# Patient Record
Sex: FEMALE | Race: WHITE | Employment: FULL TIME | URBAN - METROPOLITAN AREA
[De-identification: names, ages, dates, MRNs, and addresses within clinical notes are randomized per-mention and may not be internally consistent; named-entity substitution may affect disease eponyms.]

---

## 2019-02-11 ENCOUNTER — APPOINTMENT (OUTPATIENT)
Dept: CT IMAGING | Age: 59
End: 2019-02-11
Payer: COMMERCIAL

## 2019-02-11 ENCOUNTER — HOSPITAL ENCOUNTER (EMERGENCY)
Age: 59
Discharge: HOME | End: 2019-02-11
Payer: COMMERCIAL

## 2019-02-11 VITALS
HEART RATE: 74 BPM | TEMPERATURE: 97.9 F | OXYGEN SATURATION: 94 % | RESPIRATION RATE: 16 BRPM | DIASTOLIC BLOOD PRESSURE: 62 MMHG | SYSTOLIC BLOOD PRESSURE: 106 MMHG

## 2019-02-11 DIAGNOSIS — R91.1 NODULE OF LOWER LOBE OF LEFT LUNG: ICD-10-CM

## 2019-02-11 DIAGNOSIS — D25.9 UTERINE LEIOMYOMA, UNSPECIFIED LOCATION: ICD-10-CM

## 2019-02-11 DIAGNOSIS — N20.0 RIGHT NEPHROLITHIASIS: ICD-10-CM

## 2019-02-11 DIAGNOSIS — R10.9 RIGHT FLANK PAIN: ICD-10-CM

## 2019-02-11 DIAGNOSIS — K57.30 DIVERTICULOSIS OF COLON WITHOUT DIVERTICULITIS: ICD-10-CM

## 2019-02-11 DIAGNOSIS — N28.89 MASS OF RIGHT KIDNEY: Primary | ICD-10-CM

## 2019-02-11 DIAGNOSIS — M54.50 ACUTE RIGHT-SIDED LOW BACK PAIN WITHOUT SCIATICA: ICD-10-CM

## 2019-02-11 LAB
A/G RATIO: 1.3 (ref 1.1–2.2)
ALBUMIN SERPL-MCNC: 4 G/DL (ref 3.4–5)
ALP BLD-CCNC: 50 U/L (ref 40–129)
ALT SERPL-CCNC: 12 U/L (ref 10–40)
ANION GAP SERPL CALCULATED.3IONS-SCNC: 16 MMOL/L (ref 3–16)
AST SERPL-CCNC: 12 U/L (ref 15–37)
BASOPHILS ABSOLUTE: 0.1 K/UL (ref 0–0.2)
BASOPHILS RELATIVE PERCENT: 1.2 %
BILIRUB SERPL-MCNC: 0.6 MG/DL (ref 0–1)
BILIRUBIN URINE: NEGATIVE
BLOOD, URINE: NEGATIVE
BUN BLDV-MCNC: 14 MG/DL (ref 7–20)
CALCIUM SERPL-MCNC: 9.4 MG/DL (ref 8.3–10.6)
CHLORIDE BLD-SCNC: 102 MMOL/L (ref 99–110)
CLARITY: CLEAR
CO2: 21 MMOL/L (ref 21–32)
COLOR: YELLOW
CREAT SERPL-MCNC: 0.7 MG/DL (ref 0.6–1.1)
EOSINOPHILS ABSOLUTE: 0.5 K/UL (ref 0–0.6)
EOSINOPHILS RELATIVE PERCENT: 7.3 %
GFR AFRICAN AMERICAN: >60
GFR NON-AFRICAN AMERICAN: >60
GLOBULIN: 3 G/DL
GLUCOSE BLD-MCNC: 161 MG/DL (ref 70–99)
GLUCOSE URINE: >=1000 MG/DL
HCT VFR BLD CALC: 43.1 % (ref 36–48)
HEMOGLOBIN: 14.6 G/DL (ref 12–16)
KETONES, URINE: NEGATIVE MG/DL
LACTIC ACID: 1.8 MMOL/L (ref 0.4–2)
LEUKOCYTE ESTERASE, URINE: NEGATIVE
LYMPHOCYTES ABSOLUTE: 0.8 K/UL (ref 1–5.1)
LYMPHOCYTES RELATIVE PERCENT: 13.1 %
MCH RBC QN AUTO: 31.6 PG (ref 26–34)
MCHC RBC AUTO-ENTMCNC: 33.9 G/DL (ref 31–36)
MCV RBC AUTO: 93.2 FL (ref 80–100)
MICROSCOPIC EXAMINATION: ABNORMAL
MONOCYTES ABSOLUTE: 0.5 K/UL (ref 0–1.3)
MONOCYTES RELATIVE PERCENT: 7.4 %
NEUTROPHILS ABSOLUTE: 4.5 K/UL (ref 1.7–7.7)
NEUTROPHILS RELATIVE PERCENT: 71 %
NITRITE, URINE: NEGATIVE
PDW BLD-RTO: 13.4 % (ref 12.4–15.4)
PH UA: 5.5
PLATELET # BLD: 166 K/UL (ref 135–450)
PMV BLD AUTO: 9.3 FL (ref 5–10.5)
POTASSIUM REFLEX MAGNESIUM: 3.7 MMOL/L (ref 3.5–5.1)
PROTEIN UA: NEGATIVE MG/DL
RBC # BLD: 4.63 M/UL (ref 4–5.2)
REASON FOR REJECTION: NORMAL
REJECTED TEST: NORMAL
SODIUM BLD-SCNC: 139 MMOL/L (ref 136–145)
SPECIFIC GRAVITY UA: >1.03
TOTAL PROTEIN: 7 G/DL (ref 6.4–8.2)
URINE REFLEX TO CULTURE: ABNORMAL
URINE TYPE: ABNORMAL
UROBILINOGEN, URINE: 0.2 E.U./DL
WBC # BLD: 6.3 K/UL (ref 4–11)

## 2019-02-11 PROCEDURE — 85025 COMPLETE CBC W/AUTO DIFF WBC: CPT

## 2019-02-11 PROCEDURE — 80053 COMPREHEN METABOLIC PANEL: CPT

## 2019-02-11 PROCEDURE — 96361 HYDRATE IV INFUSION ADD-ON: CPT

## 2019-02-11 PROCEDURE — 99284 EMERGENCY DEPT VISIT MOD MDM: CPT

## 2019-02-11 PROCEDURE — 96374 THER/PROPH/DIAG INJ IV PUSH: CPT

## 2019-02-11 PROCEDURE — 2580000003 HC RX 258: Performed by: PHYSICIAN ASSISTANT

## 2019-02-11 PROCEDURE — 96375 TX/PRO/DX INJ NEW DRUG ADDON: CPT

## 2019-02-11 PROCEDURE — 87040 BLOOD CULTURE FOR BACTERIA: CPT

## 2019-02-11 PROCEDURE — 83605 ASSAY OF LACTIC ACID: CPT

## 2019-02-11 PROCEDURE — 81003 URINALYSIS AUTO W/O SCOPE: CPT

## 2019-02-11 PROCEDURE — 74176 CT ABD & PELVIS W/O CONTRAST: CPT

## 2019-02-11 PROCEDURE — 6360000002 HC RX W HCPCS: Performed by: PHYSICIAN ASSISTANT

## 2019-02-11 RX ORDER — LIDOCAINE 50 MG/G
1 PATCH TOPICAL DAILY
Qty: 30 PATCH | Refills: 0 | Status: SHIPPED | OUTPATIENT
Start: 2019-02-11

## 2019-02-11 RX ORDER — NAPROXEN 500 MG/1
500 TABLET ORAL 2 TIMES DAILY PRN
Qty: 20 TABLET | Refills: 0 | Status: SHIPPED | OUTPATIENT
Start: 2019-02-11 | End: 2019-02-21

## 2019-02-11 RX ORDER — KETOROLAC TROMETHAMINE 30 MG/ML
30 INJECTION, SOLUTION INTRAMUSCULAR; INTRAVENOUS ONCE
Status: COMPLETED | OUTPATIENT
Start: 2019-02-11 | End: 2019-02-11

## 2019-02-11 RX ORDER — METHOCARBAMOL 750 MG/1
750 TABLET, FILM COATED ORAL EVERY 8 HOURS PRN
Qty: 30 TABLET | Refills: 0 | Status: SHIPPED | OUTPATIENT
Start: 2019-02-11 | End: 2019-02-21

## 2019-02-11 RX ORDER — 0.9 % SODIUM CHLORIDE 0.9 %
1000 INTRAVENOUS SOLUTION INTRAVENOUS ONCE
Status: COMPLETED | OUTPATIENT
Start: 2019-02-11 | End: 2019-02-11

## 2019-02-11 RX ORDER — ONDANSETRON 2 MG/ML
4 INJECTION INTRAMUSCULAR; INTRAVENOUS ONCE
Status: COMPLETED | OUTPATIENT
Start: 2019-02-11 | End: 2019-02-11

## 2019-02-11 RX ADMIN — KETOROLAC TROMETHAMINE 30 MG: 30 INJECTION, SOLUTION INTRAMUSCULAR at 09:30

## 2019-02-11 RX ADMIN — ONDANSETRON 4 MG: 2 INJECTION INTRAMUSCULAR; INTRAVENOUS at 09:30

## 2019-02-11 RX ADMIN — SODIUM CHLORIDE 1000 ML: 9 INJECTION, SOLUTION INTRAVENOUS at 09:30

## 2019-02-11 ASSESSMENT — ENCOUNTER SYMPTOMS
CHEST TIGHTNESS: 0
SORE THROAT: 0
ABDOMINAL PAIN: 0
BACK PAIN: 0
DIARRHEA: 1
SHORTNESS OF BREATH: 0
NAUSEA: 1
VOMITING: 0
COLOR CHANGE: 0

## 2019-02-11 ASSESSMENT — PAIN SCALES - GENERAL
PAINLEVEL_OUTOF10: 8
PAINLEVEL_OUTOF10: 10
PAINLEVEL_OUTOF10: 10

## 2019-02-11 ASSESSMENT — PAIN DESCRIPTION - PAIN TYPE: TYPE: ACUTE PAIN

## 2019-02-16 LAB
BLOOD CULTURE, ROUTINE: NORMAL
CULTURE, BLOOD 2: NORMAL

## 2021-09-10 ENCOUNTER — APPOINTMENT (OUTPATIENT)
Dept: GENERAL RADIOLOGY | Age: 61
End: 2021-09-10
Payer: COMMERCIAL

## 2021-09-10 ENCOUNTER — HOSPITAL ENCOUNTER (EMERGENCY)
Age: 61
Discharge: HOME | End: 2021-09-10
Payer: COMMERCIAL

## 2021-09-10 VITALS
WEIGHT: 168 LBS | BODY MASS INDEX: 27 KG/M2 | RESPIRATION RATE: 16 BRPM | OXYGEN SATURATION: 97 % | TEMPERATURE: 98.8 F | HEIGHT: 66 IN | HEART RATE: 83 BPM | DIASTOLIC BLOOD PRESSURE: 70 MMHG | SYSTOLIC BLOOD PRESSURE: 123 MMHG

## 2021-09-10 DIAGNOSIS — Z20.822 PERSON UNDER INVESTIGATION FOR COVID-19: Primary | ICD-10-CM

## 2021-09-10 LAB
A/G RATIO: 1.3 (ref 1.1–2.2)
ALBUMIN SERPL-MCNC: 4.1 G/DL (ref 3.4–5)
ALP BLD-CCNC: 62 U/L (ref 40–129)
ALT SERPL-CCNC: 18 U/L (ref 10–40)
ANION GAP SERPL CALCULATED.3IONS-SCNC: 10 MMOL/L (ref 3–16)
AST SERPL-CCNC: 19 U/L (ref 15–37)
BASOPHILS ABSOLUTE: 0 K/UL (ref 0–0.2)
BASOPHILS RELATIVE PERCENT: 1 %
BILIRUB SERPL-MCNC: 0.5 MG/DL (ref 0–1)
BUN BLDV-MCNC: 13 MG/DL (ref 7–20)
CALCIUM SERPL-MCNC: 9.6 MG/DL (ref 8.3–10.6)
CHLORIDE BLD-SCNC: 104 MMOL/L (ref 99–110)
CO2: 27 MMOL/L (ref 21–32)
CREAT SERPL-MCNC: 0.7 MG/DL (ref 0.6–1.2)
D DIMER: 247 NG/ML DDU (ref 0–229)
EKG ATRIAL RATE: 86 BPM
EKG DIAGNOSIS: NORMAL
EKG P AXIS: 46 DEGREES
EKG P-R INTERVAL: 124 MS
EKG Q-T INTERVAL: 358 MS
EKG QRS DURATION: 84 MS
EKG QTC CALCULATION (BAZETT): 428 MS
EKG R AXIS: -2 DEGREES
EKG T AXIS: 55 DEGREES
EKG VENTRICULAR RATE: 86 BPM
EOSINOPHILS ABSOLUTE: 0.2 K/UL (ref 0–0.6)
EOSINOPHILS RELATIVE PERCENT: 4.3 %
GFR AFRICAN AMERICAN: >60
GFR NON-AFRICAN AMERICAN: >60
GLOBULIN: 3.2 G/DL
GLUCOSE BLD-MCNC: 99 MG/DL (ref 70–99)
HCT VFR BLD CALC: 44.3 % (ref 36–48)
HEMOGLOBIN: 15.2 G/DL (ref 12–16)
LYMPHOCYTES ABSOLUTE: 0.8 K/UL (ref 1–5.1)
LYMPHOCYTES RELATIVE PERCENT: 18.3 %
MCH RBC QN AUTO: 32.3 PG (ref 26–34)
MCHC RBC AUTO-ENTMCNC: 34.3 G/DL (ref 31–36)
MCV RBC AUTO: 94.4 FL (ref 80–100)
MONOCYTES ABSOLUTE: 0.5 K/UL (ref 0–1.3)
MONOCYTES RELATIVE PERCENT: 12.6 %
NEUTROPHILS ABSOLUTE: 2.7 K/UL (ref 1.7–7.7)
NEUTROPHILS RELATIVE PERCENT: 63.8 %
PDW BLD-RTO: 13.3 % (ref 12.4–15.4)
PLATELET # BLD: 148 K/UL (ref 135–450)
PMV BLD AUTO: 9.4 FL (ref 5–10.5)
POTASSIUM SERPL-SCNC: 4.7 MMOL/L (ref 3.5–5.1)
PRO-BNP: 7 PG/ML (ref 0–124)
RBC # BLD: 4.69 M/UL (ref 4–5.2)
SODIUM BLD-SCNC: 141 MMOL/L (ref 136–145)
TOTAL PROTEIN: 7.3 G/DL (ref 6.4–8.2)
TROPONIN: <0.01 NG/ML
WBC # BLD: 4.2 K/UL (ref 4–11)

## 2021-09-10 PROCEDURE — 93005 ELECTROCARDIOGRAM TRACING: CPT | Performed by: PHYSICIAN ASSISTANT

## 2021-09-10 PROCEDURE — 93010 ELECTROCARDIOGRAM REPORT: CPT | Performed by: INTERNAL MEDICINE

## 2021-09-10 PROCEDURE — 85379 FIBRIN DEGRADATION QUANT: CPT

## 2021-09-10 PROCEDURE — 85025 COMPLETE CBC W/AUTO DIFF WBC: CPT

## 2021-09-10 PROCEDURE — 80053 COMPREHEN METABOLIC PANEL: CPT

## 2021-09-10 PROCEDURE — 36415 COLL VENOUS BLD VENIPUNCTURE: CPT

## 2021-09-10 PROCEDURE — 71046 X-RAY EXAM CHEST 2 VIEWS: CPT

## 2021-09-10 PROCEDURE — 94640 AIRWAY INHALATION TREATMENT: CPT

## 2021-09-10 PROCEDURE — U0003 INFECTIOUS AGENT DETECTION BY NUCLEIC ACID (DNA OR RNA); SEVERE ACUTE RESPIRATORY SYNDROME CORONAVIRUS 2 (SARS-COV-2) (CORONAVIRUS DISEASE [COVID-19]), AMPLIFIED PROBE TECHNIQUE, MAKING USE OF HIGH THROUGHPUT TECHNOLOGIES AS DESCRIBED BY CMS-2020-01-R: HCPCS

## 2021-09-10 PROCEDURE — 6370000000 HC RX 637 (ALT 250 FOR IP): Performed by: PHYSICIAN ASSISTANT

## 2021-09-10 PROCEDURE — 84484 ASSAY OF TROPONIN QUANT: CPT

## 2021-09-10 PROCEDURE — U0005 INFEC AGEN DETEC AMPLI PROBE: HCPCS

## 2021-09-10 PROCEDURE — 83880 ASSAY OF NATRIURETIC PEPTIDE: CPT

## 2021-09-10 PROCEDURE — 99283 EMERGENCY DEPT VISIT LOW MDM: CPT

## 2021-09-10 RX ORDER — DEXAMETHASONE 6 MG/1
6 TABLET ORAL
Qty: 5 TABLET | Refills: 0 | Status: SHIPPED | OUTPATIENT
Start: 2021-09-10 | End: 2021-09-15

## 2021-09-10 RX ORDER — IPRATROPIUM BROMIDE AND ALBUTEROL SULFATE 2.5; .5 MG/3ML; MG/3ML
1 SOLUTION RESPIRATORY (INHALATION) ONCE
Status: COMPLETED | OUTPATIENT
Start: 2021-09-10 | End: 2021-09-10

## 2021-09-10 RX ADMIN — IPRATROPIUM BROMIDE AND ALBUTEROL SULFATE 1 AMPULE: .5; 3 SOLUTION RESPIRATORY (INHALATION) at 11:38

## 2021-09-10 ASSESSMENT — ENCOUNTER SYMPTOMS
SHORTNESS OF BREATH: 1
NAUSEA: 0
VOMITING: 0
COUGH: 1
DIARRHEA: 0
RHINORRHEA: 0
ABDOMINAL PAIN: 0

## 2021-09-10 NOTE — ED PROVIDER NOTES
[facility]        Pt Name: Sandrita Motta  MRN: [id number]  [place] 1960  Date of evaluation: 9/10/2021  Provider: Radha Sullivan PA-C  PCP: Scar Mcdonnell  Note Started: 11:39 AM EDT       DARIA. I have evaluated this patient. My supervising physician was available for consultation. CHIEF COMPLAINT       Chief Complaint   Patient presents with    Shortness of Breath     States she had her second [place] on Monday; on Wednesday states she started to have SOB and headaches. States she is not able to take a full deep breath.  Headache       HISTORY OF PRESENT ILLNESS   (Location, Timing/Onset, Context/Setting, Quality, Duration, Modifying Factors, Severity, Associated Signs and Symptoms)  Note limiting factors. Chief Complaint: Chest tightness, shortness of breath, cough    Sandrita Motta is a 62 y.o. female who presents to the emergency department today for evaluation for chest tightness, shortness of breath, cough, and a headache. Patient has been reporting intermittent headache for the past 2 days has no headache at this time. The patient states that her symptoms have been constant, gradual onset 2 days ago. The patient states that on Monday, she did receive her second COVID-19 vaccination. The patient tells me that prior to getting her vaccine, she picked up her granddaughter from school, and she states that she was in the car with her granddaughter and actually got her back off at school. She states that she recently found out that her granddaughter's teacher was positive for COVID-19, she is unsure if this could be a positive exposure. Patient states that she is just experiencing a \"discomfort\" to her chest, but she states that this is mostly associated with a cough. States that her cough is productive of sputum but she denies any hemoptysis.   The patient states that she is experiencing some shortness of breath, she does have a history of asthma, she is been attempting to use her inhalers without much improvement. Patient denies any wheezing. The patient states that she is able to take a full deep breath and however she feels that she has to force herself to do so. The patient denies any abdominal pain, nausea, vomiting or diarrhea. The patient denies any urinary symptoms. She is not have any fevers. The patient otherwise has no complaints at this time. Nursing Notes were all reviewed and agreed with or any disagreements were addressed in the HPI. REVIEW OF SYSTEMS    (2-9 systems for level 4, 10 or more for level 5)     Review of Systems   Constitutional: Negative for activity change, appetite change, chills and fever. HENT: Negative for congestion and rhinorrhea. Respiratory: Positive for cough and shortness of breath. Cardiovascular: Negative for chest pain. Gastrointestinal: Negative for abdominal pain, diarrhea, nausea and vomiting. Genitourinary: Negative for difficulty urinating, dysuria and hematuria. Neurological: Negative for weakness and numbness. Positives and Pertinent negatives as per HPI. Except as noted above in the ROS, all other systems were reviewed and negative. PAST MEDICAL HISTORY     Past Medical History:   Diagnosis Date    Asthma     Diabetes mellitus ([facility] [address] [facility]     Hypertension     MS (multiple sclerosis) ([facility]          SURGICAL HISTORY   History reviewed. No pertinent surgical history. [address]       Discharge Medication List as of 9/10/2021 12:51 PM      CONTINUE these medications which have NOT CHANGED    Details   naproxen (NAPROSYN) 500 MG tablet Take 1 tablet by mouth 2 times daily as needed for Pain, Disp-20 tablet, R-0Print      lidocaine (LIDODERM) 5 % Place 1 patch onto the skin daily 12 hours on, 12 hours off., Disp-30 patch, R-0Print               ALLERGIES     Patient has no known allergies. FAMILYHISTORY     History reviewed.  No pertinent family history. SOCIAL HISTORY       Social History     Tobacco Use    Smoking status: Never Smoker    Smokeless tobacco: Never Used   Substance Use Topics    Alcohol use: No    Drug use: No       SCREENINGS             PHYSICAL EXAM    (up to 7 for level 4, 8 or more for level 5)     ED Triage Vitals [09/10/21 1105]   BP Temp Temp Source Pulse Resp SpO2 Height Weight   123/70 98.8 °F (37.1 °C) Oral 83 16 97 % 5' 6\" (1.676 m) 168 lb (76.2 kg)       Physical Exam  Vitals and nursing note reviewed. Constitutional:       Appearance: She is well-developed. She is not diaphoretic. Comments: Well-appearing in no acute distress, sitting in bed comfortably and in no acute distress   HENT:      Head: Normocephalic and atraumatic. Right Ear: External ear normal.      Left Ear: External ear normal.      Nose: Nose normal.   Eyes:      General:         Right eye: No discharge. Left eye: No discharge. Neck:      Trachea: No tracheal deviation. Cardiovascular:      Rate and Rhythm: Normal rate and regular rhythm. Pulmonary:      Effort: Pulmonary effort is normal. No respiratory distress. Comments: Slightly diminished aeration throughout all lung fields  Abdominal:      General: Bowel sounds are normal. There is no distension. Palpations: Abdomen is soft. Tenderness: There is no abdominal tenderness. There is no guarding. Musculoskeletal:         General: Normal range of motion. Cervical back: Normal range of motion and neck supple. Skin:     General: Skin is warm and dry. Neurological:      Mental Status: She is alert and oriented to person, place, and time.    Psychiatric:         Behavior: Behavior normal.         DIAGNOSTIC RESULTS   LABS:    Labs Reviewed   CBC WITH AUTO DIFFERENTIAL - Abnormal; Notable for the following components:       Result Value    Lymphocytes Absolute 0.8 (*)     All other components within normal limits    Narrative:     Performed at:  [facility]  [address],  [place], [address]   Phone [phone]   D-DIMER, QUANTITATIVE - Abnormal; Notable for the following components:    D-Dimer, Quant 247 (*)     All other components within normal limits    Narrative:     Performed at:  [facility]  [address],  [place], [address]   Phone [phone]   COMPREHENSIVE METABOLIC PANEL    Narrative:     Performed at:  [facility]  [address],  [place], [address]   Phone [phone]   TROPONIN    Narrative:     Performed at:  [facility] [address] [facility],  [place], [address]   Phone [phone]   BRAIN NATRIURETIC PEPTIDE    Narrative:     Performed at:  [facility] [address],  [place], [address]   Phone [phone]       When ordered only abnormal lab results are displayed. All other labs were within normal range or not returned as of this dictation. EKG: When ordered, EKG's are interpreted by the Emergency Department Physician in the absence of a cardiologist.  Please see their note for interpretation of EKG. RADIOLOGY:   Non-plain film images such as CT, Ultrasound and MRI are read by the radiologist. Plain radiographic images are visualized and preliminarily interpreted by the ED Provider with the below findings:        Interpretation per the Radiologist below, if available at the time of this note:    XR CHEST (2 VW)   Final Result   No acute process. No results found.         PROCEDURES   Unless otherwise noted below, none     Procedures    CRITICAL CARE TIME   N/A    CONSULTS:  None      EMERGENCY DEPARTMENT COURSE and DIFFERENTIAL DIAGNOSIS/MDM:   Vitals:    Vitals:    09/10/21 1105   BP: 123/70   Pulse: 83   Resp: 16   Temp: 98.8 °F (37.1 °C)   TempSrc: Oral   SpO2: 97%   Weight: 168 lb (76.2 kg)   Height: 5' 6\" (1.676 m) Patient was given the following medications:  Medications   ipratropium-albuterol (DUONEB) nebulizer solution 1 ampule (1 ampule Inhalation Given 9/10/21 9296)           Briefly, this is a 11year-old female who presents emergency department today for evaluation for shortness of breath, chest tightness, and cough. Symptoms have been ongoing for the past 2 days. She states that she received her second Covid vaccine on Monday, however she states prior to that she was around somebody who may have had a COVID-19 exposure. She reports chest tightness, but mostly with cough    Physical examination, she is slightly demonstration throughout all lung fields, otherwise is unremarkable    EKG is documented by Dr. Tomasa Bullock, please see his note for further details. CBC shows no evidence of leukocytosis or anemia. CMP is unremarkable. Troponin is negative. Age-adjusted D-dimer is actually normal.  Chest x-ray is negative    Her COVID-19 is pending. She is given a breathing treatment in the ED is overall feeling better. She will be given a prescription for Decadron. Supportive care discussed at home with strict return precautions given she is to return to the ED for any new or worsening symptoms, the patient voiced understanding is agreeable with plan. She is stable for discharge. . Clinically feel that her symptoms today are likely secondary to underlying COVID-19, as her troponin is negative, she is stable vital signs, chest x-ray is negative I feel she can manage as an outpatient    I estimate there is LOW risk for PE, ACS, pneumonia, pleural effusion, pneumothorax, myocarditis, pericarditis, cardiac tamponade, life-threatening arrhythmia, respiratory distress, acute dissectionthus I consider the discharge disposition reasonable. Vivian Eid and ALEJANDRA have discussed the diagnosis and risks, and we agree with discharging home to follow-up with their primary doctor.  We also discussed returning to the Emergency Department immediately if new or worsening symptoms occur. We have discussed the symptoms which are most concerning (e.g., bloody sputum, fever, worsening pain or shortness of breath, vomiting) that necessitate immediate return. FINAL IMPRESSION      1.  Person under investigation for COVID-19          DISPOSITION/PLAN   DISPOSITION Decision To Discharge 09/10/2021 12:50:13 PM      PATIENT REFERRED TO:  Jorge Griffin  [address] [facility] [address]  [address]  [phone]    Schedule an appointment as soon as possible for a visit in 2 days      [facility] [facility] Emergency Department  [address] [place] [address]  [phone]    As needed, If symptoms worsen      DISCHARGE MEDICATIONS:  Discharge Medication List as of 9/10/2021 12:51 PM      START taking these medications    Details   dexamethasone (DECADRON) 6 MG tablet Take 1 tablet by mouth daily (with breakfast) for 5 days, Disp-5 tablet, R-0Print             DISCONTINUED MEDICATIONS:  Discharge Medication List as of 9/10/2021 12:51 PM                 (Please note that portions of this note were completed with a voice recognition program.  Efforts were made to edit the dictations but occasionally words are mis-transcribed.)    Dell Bautista PA-C (electronically signed)            Dell Bautista PA-C  09/10/21 5364

## 2021-09-10 NOTE — ED PROVIDER NOTES
EKG is reviewed by myself. Dated today 1206. Rate 86 normal sinus rhythm normal EKG. No change compared to November 2011.      Patsy Mane MD  09/10/21 6492

## 2021-09-11 LAB — SARS-COV-2: DETECTED

## 2021-09-13 ENCOUNTER — CARE COORDINATION (OUTPATIENT)
Dept: CARE COORDINATION | Age: 61
End: 2021-09-13

## 2021-09-14 ENCOUNTER — CARE COORDINATION (OUTPATIENT)
Dept: CARE COORDINATION | Age: 61
End: 2021-09-14

## 2021-09-14 NOTE — CARE COORDINATION
Patient contacted regarding COVID-19 diagnosis and pulse oximeter ordered at discharge. Discussed COVID-19 related testing which was available at this time. Test results were positive. Patient informed of results, if available? Yes. Ambulatory Care Manager contacted the patient by telephone to perform post discharge assessment. Call within 2 business days of discharge: Yes. Verified name and  with patient as identifiers. Provided introduction to self, and explanation of the CTN/ACM role, and reason for call due to risk factors for infection and/or exposure to COVID-19. Symptoms reviewed with patient who verbalized the following symptoms: cough. Due to no new or worsening symptoms encounter was not routed to provider for escalation. Discussed follow-up appointments. If no appointment was previously scheduled, appointment scheduling offered: Yes. [facility] follow up appointment(s): No future appointments. Non-[facility] follow up appointment(s): face-to-face services provided:  Obtained and reviewed discharge summary and/or continuity of care documents  Reviewed and followed up on pending diagnostic tests and treatments-covid  Education of patient/family/caregiver/guardian to support self-management-covid     Advance Care Planning:   Does patient have an Advance Directive:  not on file. Educated patient about risk for severe COVID-19 due to risk factors according to CDC guidelines. ACM reviewed discharge instructions, medical action plan and red flag symptoms with the patient who verbalized understanding. Discussed COVID vaccination status: Jacqueline Quiroga has had both shots Education provided on COVID-19 vaccination as appropriate. Discussed exposure protocols and quarantine with CDC Guidelines. Patient was given an opportunity to verbalize any questions and concerns and agrees to contact ACM or health care provider for questions related to their healthcare.     Reviewed and educated patient on any new and changed medications related to discharge diagnosis     Was patient discharged with a pulse oximeter? Yes Discussed and confirmed pulse oximeter discharge instructions and when to notify provider or seek emergency care. ACM provided contact information. Plan for follow-up call in 3-5 days based on severity of symptoms and risk factors.     Pt reports feeling better than she was   returned to [facility] on 9/11   did not complete dexamethasone due to BG being very high   pt has asthma, MS  She has second covid vaccination the MOnday before she tested positive    Pt to call pcp today re treatment

## 2021-09-20 ENCOUNTER — CARE COORDINATION (OUTPATIENT)
Dept: CARE COORDINATION | Age: 61
End: 2021-09-20

## 2025-05-11 ENCOUNTER — HOSPITAL ENCOUNTER (EMERGENCY)
Age: 65
Discharge: HOME | End: 2025-05-11
Attending: STUDENT IN AN ORGANIZED HEALTH CARE EDUCATION/TRAINING PROGRAM
Payer: MEDICARE

## 2025-05-11 VITALS
SYSTOLIC BLOOD PRESSURE: 107 MMHG | DIASTOLIC BLOOD PRESSURE: 70 MMHG | HEART RATE: 92 BPM | TEMPERATURE: 97.7 F | OXYGEN SATURATION: 98 % | RESPIRATION RATE: 17 BRPM

## 2025-05-11 DIAGNOSIS — L23.7 POISON IVY DERMATITIS: Primary | ICD-10-CM

## 2025-05-11 PROCEDURE — 99282 EMERGENCY DEPT VISIT SF MDM: CPT

## 2025-05-11 NOTE — ED PROVIDER NOTES
THE [facility]  EMERGENCY DEPARTMENT ENCOUNTER          EM RESIDENT NOTE       Date of evaluation: 5/11/2025    Chief Complaint     Rash      History of Present Illness     Anne Finnegan is a 65 y.o. female who presents with concern for poison ivy. Pulling weeds on Thursday and noticed onset of itchy rash on front and back of arms and thighs/back of knees. Has been using calamine lotion, ivy rest, and benadryl with some relief. Not painful, just itchy. No fevers or systemic sx. Most lesions crusted over aside from leg    MEDICAL DECISION MAKING / ASSESSMENT / PLAN     INITIAL VITALS: BP: 107/70, Temp: 97.7 °F (36.5 °C), Pulse: 92, Respirations: 17, SpO2: 98 %    Anne Finnegan is a 65 y.o. female presenting with poison ivy dermatitis. Vs wnl. Most lesions crusted over aside from one weeping on leg. Given exposure hx and distribution, low concern for other form of pathologic rash. Discussed continuing her current regimen and can add domeboro soaked dressing for additional relief and/or zanfel. Discussed since rash distribution is mild and not in sensitive area, did not feel severe enough to warrant oral steroid course. Discussed covering open lesions to avoid transmission/spread and PCP follow up in 3-4 days if sx worse. Pt verbalizes understanding and was amenable to this plan. Deemed stable for dc home at this time.    Is this patient to be included in the SEP-1 core measure? No Exclusion criteria - the patient is NOT to be included for SEP-1 Core Measure due to: Infection is not suspected    Medical Decision Making      This patient was also evaluated by the attending physician. All care plans werediscussed and agreed upon.    Clinical Impression     1. Poison ivy dermatitis        Disposition     PATIENT REFERRED TO:  Alvin Arreola MD  [address]  [facility] [address] [phone]    Schedule an appointment as soon as possible for a visit in 3 days  If symptoms worsen      DISCHARGE

## 2025-05-11 NOTE — DISCHARGE INSTRUCTIONS
Try domeboro soaked gauze wrappings for additional relief of symptoms and/or zanfel which is a product to help with itching and stripping the poison ivy oils from the skin. Your lesions are contagious as long as they are still weeping, so keep those lesions covered until all are crusted over or wear longer garments to avoid spreading the oil and rash.

## 2025-05-11 NOTE — ED PROVIDER NOTES
ED Attending Attestation Note     Date of evaluation: 5/11/2025    I have discussed the case with the resident. I have personally performed a history, physical exam, and my own medical decision making. I have reviewed the note and agree with the findings and plan.     INITIAL VITALS: BP: 107/70, Temp: 97.7 °F (36.5 °C), Pulse: 92, Respirations: 17, SpO2: 98 %     MDM:  My assessment reveals a well-appearing 65-year-old female presenting with concern for poison ivy.  Patient does have a diffuse itchy and erythematous rash consistent with poison ivy.  She is already using calamine lotion.  Patient will be counseled on the various symptomatic management modalities of poison ivy and discharged with PCP follow-up and strict return precautions.  No indication for systemic or high-dose topical corticosteroids today         Eh Panda MD  05/11/25 1118

## 2025-08-03 ENCOUNTER — APPOINTMENT (OUTPATIENT)
Dept: GENERAL RADIOLOGY | Age: 65
End: 2025-08-03
Payer: MEDICARE

## 2025-08-03 ENCOUNTER — HOSPITAL ENCOUNTER (EMERGENCY)
Age: 65
Discharge: HOME | End: 2025-08-03
Attending: STUDENT IN AN ORGANIZED HEALTH CARE EDUCATION/TRAINING PROGRAM
Payer: MEDICARE

## 2025-08-03 VITALS
WEIGHT: 168.6 LBS | DIASTOLIC BLOOD PRESSURE: 79 MMHG | RESPIRATION RATE: 12 BRPM | HEIGHT: 66 IN | HEART RATE: 70 BPM | TEMPERATURE: 98.4 F | BODY MASS INDEX: 27.1 KG/M2 | OXYGEN SATURATION: 97 % | SYSTOLIC BLOOD PRESSURE: 115 MMHG

## 2025-08-03 DIAGNOSIS — R07.9 CHEST PAIN, UNSPECIFIED TYPE: Primary | ICD-10-CM

## 2025-08-03 LAB
ANION GAP SERPL CALCULATED.3IONS-SCNC: 10 MMOL/L (ref 3–16)
ANION GAP SERPL CALCULATED.3IONS-SCNC: 11 MMOL/L (ref 3–16)
BASOPHILS # BLD: 0.1 K/UL (ref 0–0.2)
BASOPHILS NFR BLD: 1 %
BUN SERPL-MCNC: 14 MG/DL (ref 7–20)
BUN SERPL-MCNC: 14 MG/DL (ref 7–20)
CALCIUM SERPL-MCNC: 8.5 MG/DL (ref 8.3–10.6)
CALCIUM SERPL-MCNC: 9.9 MG/DL (ref 8.3–10.6)
CHLORIDE SERPL-SCNC: 101 MMOL/L (ref 99–110)
CHLORIDE SERPL-SCNC: 107 MMOL/L (ref 99–110)
CO2 SERPL-SCNC: 20 MMOL/L (ref 21–32)
CO2 SERPL-SCNC: 23 MMOL/L (ref 21–32)
CREAT SERPL-MCNC: 0.7 MG/DL (ref 0.6–1.2)
CREAT SERPL-MCNC: 0.8 MG/DL (ref 0.6–1.2)
DEPRECATED RDW RBC AUTO: 13.2 % (ref 12.4–15.4)
EOSINOPHIL # BLD: 0.2 K/UL (ref 0–0.6)
EOSINOPHIL NFR BLD: 2.5 %
GFR SERPLBLD CREATININE-BSD FMLA CKD-EPI: 82 ML/MIN/{1.73_M2}
GFR SERPLBLD CREATININE-BSD FMLA CKD-EPI: >90 ML/MIN/{1.73_M2}
GLUCOSE SERPL-MCNC: 128 MG/DL (ref 70–99)
GLUCOSE SERPL-MCNC: 155 MG/DL (ref 70–99)
HCT VFR BLD AUTO: 41.6 % (ref 36–48)
HGB BLD-MCNC: 14.3 G/DL (ref 12–16)
LYMPHOCYTES # BLD: 1.7 K/UL (ref 1–5.1)
LYMPHOCYTES NFR BLD: 26.7 %
MCH RBC QN AUTO: 32.4 PG (ref 26–34)
MCHC RBC AUTO-ENTMCNC: 34.5 G/DL (ref 31–36)
MCV RBC AUTO: 93.9 FL (ref 80–100)
MONOCYTES # BLD: 0.6 K/UL (ref 0–1.3)
MONOCYTES NFR BLD: 9.3 %
NEUTROPHILS # BLD: 3.8 K/UL (ref 1.7–7.7)
NEUTROPHILS NFR BLD: 60.5 %
NT-PROBNP SERPL-MCNC: <36 PG/ML (ref 0–124)
PLATELET # BLD AUTO: 156 K/UL (ref 135–450)
PMV BLD AUTO: 9.8 FL (ref 5–10.5)
POTASSIUM SERPL-SCNC: 4.1 MMOL/L (ref 3.5–5.1)
POTASSIUM SERPL-SCNC: ABNORMAL MMOL/L (ref 3.5–5.1)
POTASSIUM SERPL-SCNC: ABNORMAL MMOL/L (ref 3.5–5.1)
RBC # BLD AUTO: 4.43 M/UL (ref 4–5.2)
REASON FOR REJECTION: NORMAL
REJECTED TEST: NORMAL
SODIUM SERPL-SCNC: 135 MMOL/L (ref 136–145)
SODIUM SERPL-SCNC: 137 MMOL/L (ref 136–145)
TROPONIN, HIGH SENSITIVITY: <6 NG/L (ref 0–14)
TROPONIN, HIGH SENSITIVITY: <6 NG/L (ref 0–14)
WBC # BLD AUTO: 6.2 K/UL (ref 4–11)

## 2025-08-03 PROCEDURE — 99285 EMERGENCY DEPT VISIT HI MDM: CPT

## 2025-08-03 PROCEDURE — 83880 ASSAY OF NATRIURETIC PEPTIDE: CPT

## 2025-08-03 PROCEDURE — 80048 BASIC METABOLIC PNL TOTAL CA: CPT

## 2025-08-03 PROCEDURE — 84484 ASSAY OF TROPONIN QUANT: CPT

## 2025-08-03 PROCEDURE — 71046 X-RAY EXAM CHEST 2 VIEWS: CPT

## 2025-08-03 PROCEDURE — 93005 ELECTROCARDIOGRAM TRACING: CPT

## 2025-08-03 PROCEDURE — 85025 COMPLETE CBC W/AUTO DIFF WBC: CPT

## 2025-08-03 RX ORDER — BENAZEPRIL HYDROCHLORIDE 20 MG/1
1 TABLET ORAL DAILY
COMMUNITY

## 2025-08-03 RX ORDER — VALACYCLOVIR HYDROCHLORIDE 1 G/1
1 TABLET, FILM COATED ORAL DAILY
COMMUNITY

## 2025-08-03 RX ORDER — LIRAGLUTIDE 6 MG/ML
1.8 INJECTION SUBCUTANEOUS DAILY
COMMUNITY

## 2025-08-03 RX ORDER — TEMAZEPAM 15 MG/1
2 CAPSULE ORAL NIGHTLY PRN
COMMUNITY

## 2025-08-03 RX ORDER — MONTELUKAST SODIUM 10 MG/1
1 TABLET ORAL NIGHTLY
COMMUNITY

## 2025-08-03 RX ORDER — EMPAGLIFLOZIN 25 MG/1
25 TABLET, FILM COATED ORAL DAILY
COMMUNITY
Start: 2025-02-26

## 2025-08-03 RX ORDER — GLIMEPIRIDE 4 MG/1
1 TABLET ORAL
COMMUNITY

## 2025-08-03 RX ORDER — SIMVASTATIN 20 MG
1 TABLET ORAL NIGHTLY
COMMUNITY

## 2025-08-03 ASSESSMENT — LIFESTYLE VARIABLES
HOW MANY STANDARD DRINKS CONTAINING ALCOHOL DO YOU HAVE ON A TYPICAL DAY: PATIENT DOES NOT DRINK
HOW OFTEN DO YOU HAVE A DRINK CONTAINING ALCOHOL: NEVER

## 2025-08-03 ASSESSMENT — PAIN DESCRIPTION - DESCRIPTORS: DESCRIPTORS: ACHING

## 2025-08-03 ASSESSMENT — PAIN - FUNCTIONAL ASSESSMENT: PAIN_FUNCTIONAL_ASSESSMENT: 0-10

## 2025-08-03 ASSESSMENT — PAIN SCALES - GENERAL: PAINLEVEL_OUTOF10: 5

## 2025-08-03 ASSESSMENT — PAIN DESCRIPTION - LOCATION: LOCATION: CHEST

## 2025-08-03 ASSESSMENT — PAIN DESCRIPTION - ORIENTATION: ORIENTATION: LEFT;UPPER

## 2025-08-03 ASSESSMENT — PAIN DESCRIPTION - PAIN TYPE: TYPE: ACUTE PAIN

## 2025-08-04 LAB
EKG ATRIAL RATE: 78 BPM
EKG DIAGNOSIS: NORMAL
EKG P AXIS: 34 DEGREES
EKG P-R INTERVAL: 132 MS
EKG Q-T INTERVAL: 370 MS
EKG QRS DURATION: 84 MS
EKG QTC CALCULATION (BAZETT): 421 MS
EKG R AXIS: 2 DEGREES
EKG T AXIS: 41 DEGREES
EKG VENTRICULAR RATE: 78 BPM